# Patient Record
Sex: MALE | Race: WHITE
[De-identification: names, ages, dates, MRNs, and addresses within clinical notes are randomized per-mention and may not be internally consistent; named-entity substitution may affect disease eponyms.]

---

## 2019-10-19 ENCOUNTER — HOSPITAL ENCOUNTER (OUTPATIENT)
Dept: HOSPITAL 76 - EMS | Age: 84
Discharge: ANOTHER HOSPITAL | End: 2019-10-19
Attending: SURGERY
Payer: MEDICARE

## 2019-10-19 ENCOUNTER — HOSPITAL ENCOUNTER (EMERGENCY)
Dept: HOSPITAL 76 - ED | Age: 84
Discharge: ANOTHER HOSPITAL | End: 2019-10-19
Payer: MEDICARE

## 2019-10-19 VITALS — DIASTOLIC BLOOD PRESSURE: 83 MMHG | SYSTOLIC BLOOD PRESSURE: 138 MMHG

## 2019-10-19 DIAGNOSIS — I10: ICD-10-CM
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
## 2019-10-19 DIAGNOSIS — Z85.72: ICD-10-CM

## 2019-10-19 DIAGNOSIS — Z92.3: ICD-10-CM

## 2019-10-19 DIAGNOSIS — S22.030A: Primary | ICD-10-CM

## 2019-10-19 DIAGNOSIS — S22.039A: Primary | ICD-10-CM
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
## 2019-10-19 LAB
ANION GAP SERPL CALCULATED.4IONS-SCNC: 12 MMOL/L (ref 6–13)
BASOPHILS NFR BLD AUTO: 0.1 10^3/UL (ref 0–0.1)
BASOPHILS NFR BLD AUTO: 0.6 %
BUN SERPL-MCNC: 20 MG/DL (ref 6–20)
CALCIUM UR-MCNC: 9.2 MG/DL (ref 8.5–10.3)
CHLORIDE SERPL-SCNC: 96 MMOL/L (ref 101–111)
CO2 SERPL-SCNC: 30 MMOL/L (ref 21–32)
CREAT SERPLBLD-SCNC: 0.8 MG/DL (ref 0.6–1.2)
EOSINOPHIL # BLD AUTO: 0.1 10^3/UL (ref 0–0.7)
EOSINOPHIL NFR BLD AUTO: 0.9 %
ERYTHROCYTE [DISTWIDTH] IN BLOOD BY AUTOMATED COUNT: 13.3 % (ref 12–15)
GFRSERPLBLD MDRD-ARVRAT: 93 ML/MIN/{1.73_M2} (ref 89–?)
GLUCOSE SERPL-MCNC: 135 MG/DL (ref 70–100)
HGB UR QL STRIP: 15.3 G/DL (ref 14–18)
LYMPHOCYTES # SPEC AUTO: 0.4 10^3/UL (ref 1.5–3.5)
LYMPHOCYTES NFR BLD AUTO: 3.9 %
MCH RBC QN AUTO: 30.5 PG (ref 27–31)
MCHC RBC AUTO-ENTMCNC: 32.9 G/DL (ref 32–36)
MCV RBC AUTO: 92.8 FL (ref 80–94)
MONOCYTES # BLD AUTO: 0.7 10^3/UL (ref 0–1)
MONOCYTES NFR BLD AUTO: 6.3 %
NEUTROPHILS # BLD AUTO: 9.6 10^3/UL (ref 1.5–6.6)
NEUTROPHILS # SNV AUTO: 10.9 X10^3/UL (ref 4.8–10.8)
NEUTROPHILS NFR BLD AUTO: 87.7 %
PDW BLD AUTO: 10.2 FL (ref 7.4–11.4)
PLATELET # BLD: 153 10^3/UL (ref 130–450)
RBC MAR: 5.01 10^6/UL (ref 4.7–6.1)
SODIUM SERPLBLD-SCNC: 138 MMOL/L (ref 135–145)

## 2019-10-19 PROCEDURE — 96374 THER/PROPH/DIAG INJ IV PUSH: CPT

## 2019-10-19 PROCEDURE — 80048 BASIC METABOLIC PNL TOTAL CA: CPT

## 2019-10-19 PROCEDURE — 36415 COLL VENOUS BLD VENIPUNCTURE: CPT

## 2019-10-19 PROCEDURE — 99285 EMERGENCY DEPT VISIT HI MDM: CPT

## 2019-10-19 PROCEDURE — 85025 COMPLETE CBC W/AUTO DIFF WBC: CPT

## 2019-10-19 PROCEDURE — 71250 CT THORAX DX C-: CPT

## 2019-10-19 NOTE — CT REPORT
Reason:  fall, back pain

Procedure Date:  10/19/2019   

Accession Number:  [id number] / [id number]                    

Procedure:  CT  - CHEST WO CPT Code:  

 

FULL RESULT:

 

 

EXAM:

CT CHEST

 

EXAM DATE: 10/19/2019 05:06 PM.

 

CLINICAL HISTORY: Fall, back pain.

 

COMPARISONS: None.

 

TECHNIQUE: Routine helical CT imaging was performed through the chest. IV 

contrast: None. Reconstructions: Coronal and sagittal.

 

In accordance with CT protocol optimization, one or more of the following 

dose reduction techniques were utilized for this exam: automated exposure 

control, adjustment of mA and/or KV based on patient size, or use of 

iterative reconstructive technique.

 

FINDINGS:

Lungs/Pleura: Patchy opacities are seen in the bilateral pulmonary 

apices. Irregular shaped focus of nodular consolidation is seen in the 

posterior aspect of the left upper lobe measuring up to 1.5 cm. 

Similarly, there is a 0.8 cm focus of nodular consolidation in the 

superior segment of the left lower lobe along with a similar 1.3 cm focus 

of nodular consolidation in the medial aspect of the left lower lobe with 

a regular borders. Central bronchial wall thickening is seen with mild 

cylindrical bronchiectasis which could reflect sequelae of chronic 

reactive airways or bronchitis. No other focal consolidation. No pleural 

effusions.

 

Mediastinum: Normal heart size. No pericardial effusion. Moderate 

coronary artery calcifications noted. Mild atheromatous Plaque of the 

Aortic Arch. No Enlarged Mediastinal or Hilar Lymph Nodes.

 

Bones: Osteopenia. Age indeterminate compression deformity of T3 is seen 

with approximately 50% vertebral body height loss and evidence for mild 

retropulsion.

 

Visualized Abdomen: Cholecystectomy evident.

 

Other: None.

IMPRESSION:

1. Biapical patchy opacities along with the regular nodular foci of 

consolidation at the left upper lobe and left lower lobe are of unclear 

etiology. Findings could reflect infection in the appropriate context. 

Malignancy is also not entirely excluded given the imaging appearance. 

Correlation with patient's symptoms is recommended. Short interval 

follow-up CT in 3 months is recommended for reassessment.

2. Bilateral central bronchial wall thickening with mild bronchiectasis 

could reflect sequelae of chronic central airways disease.

3. No enlarged mediastinal or hilar lymph nodes.

4. Compression fracture of T3 is seen with approximately 50% vertebral 

height loss and mild retropulsion posteriorly into the spinal canal. This 

is worrisome for acute compression fracture. Thoracic spine MRI should be 

considered for further assessment, for evaluation of possible spinal cord 

involvement.

 

RADIA

## 2019-10-19 NOTE — ED PHYSICIAN DOCUMENTATION
History of Present Illness





- Stated complaint


Stated Complaint: GLF





- Chief complaint


Chief Complaint: General





- History obtained from


History obtained from: Patient, Family





- History of Present Illness


Timing: Today


Pain level max: 7


Pain level now: 5





- Additonal information


Additional information: 





83-year-old male [place] the [place] today when he was walking on a

bale of hay, slipped and fell backwards landing on his upper back. No head or 

neck pain.  No loss of consciousness.  No vomiting.  Not on blood thinners.  Is 

having pain near the shoulder blades bilaterally.  Worse with movement and 

better with rest.  Did not take anything prior to arrival.  No hip, knee or 

ankle pain.





Review of Systems


Ten Systems: 10 systems reviewed and negative


Constitutional: denies: Fever, Chills


Ears: denies: Ear pain


Nose: denies: Rhinorrhea / runny nose, Congestion


Respiratory: denies: Cough


GI: denies: Nausea, Vomiting, Diarrhea


: denies: Dysuria


Skin: denies: Rash


Musculoskeletal: denies: Neck pain


Neurologic: denies: Focal weakness, Numbness, Headache





PD PAST MEDICAL HISTORY





- Past Medical History


Past Medical History: Yes


Cardiovascular: Hypertension





- Present Medications


Home Medications: 


                                Ambulatory Orders











 Medication  Instructions  Recorded  Confirmed


 


Hydrochlorothiazide  10/19/19 


 


Lisinopril  10/19/19 














- Allergies


Allergies/Adverse Reactions: 


                                    Allergies











Allergy/AdvReac Type Severity Reaction Status Date / Time


 


No Known Drug Allergies Allergy   Verified 10/19/19 15:52














- Living Situation


Living Situation: reports: With family


Living Arrangement: reports: At home





- Social History


Does the pt smoke?: No


Does the pt have substance abuse?: No





- Family History


Family history: reports: Non contributory





PD ED PE NORMAL





- Vitals


Vital signs reviewed: Yes





- General


General: Alert and oriented X 3, No acute distress





- HEENT


HEENT: Atraumatic, PERRL, Moist mucous membranes





- Neck


Neck: Supple, no meningeal sign, No bony TTP





- Cardiac


Cardiac: RRR, Strong equal pulses





- Respiratory


Respiratory: No respiratory distress, Clear bilaterally





- Abdomen


Abdomen: Soft, Non tender, Non distended





- Back


Back: Other (Mild paraspinal tenderness bilateral upper thoracic area.Pain is 

worse with movement No crepitus.  No tenderness over the ribs.)





- Derm


Derm: Warm and dry





- Extremities


Extremities: No deformity, No tenderness to palpate, Other (Normal bilateral 

lower extremity patellar and ankle jerk reflexes. Normal great toe extension 

bilaterally. no saddle anesthesia)





- Neuro


Neuro: Alert and oriented X 3, CNs 2-12 intact, No motor deficit, No sensory 

deficit, Normal speech





- Psych


Psych: Normal mood, Normal affect





Results





- Vitals


Vitals: 


                               Vital Signs - 24 hr











  10/19/19 10/19/19





  15:50 18:34


 


Temperature 36.8 C 


 


Heart Rate 76 91


 


Respiratory 20 14





Rate  


 


Blood Pressure 118/64 145/81 H


 


O2 Saturation 97 99








                                     Oxygen











O2 Source                      Room air

















- Labs


Labs: 


                                Laboratory Tests











  10/19/19 10/19/19





  18:30 18:30


 


WBC  10.9 H 


 


RBC  5.01 


 


Hgb  15.3 


 


Hct  46.5 


 


MCV  92.8 


 


MCH  30.5 


 


MCHC  32.9 


 


RDW  13.3 


 


Plt Count  153 


 


MPV  10.2 


 


Neut # (Auto)  9.6 H 


 


Lymph # (Auto)  0.4 L 


 


Mono # (Auto)  0.7 


 


Eos # (Auto)  0.1 


 


Baso # (Auto)  0.1 


 


Absolute Nucleated RBC  0.00 


 


Nucleated RBC %  0.0 


 


Sodium   138


 


Potassium   4.0


 


Chloride   96 L


 


Carbon Dioxide   30


 


Anion Gap   12.0


 


BUN   20


 


Creatinine   0.8


 


Estimated GFR (MDRD)   93


 


Glucose   135 H


 


Calcium   9.2














- Rads (name of study)


  ** CT chest 


Radiology: Prelim report reviewed, EMP read contemporaneously, See rad report 

(1. Biapical patchy opacities along with the regular nodular foci of 

consolidation at the left upper lobe and left lower lobe are of unclear 

etiology. Findings could reflect infection in the appropriate context. 

Malignancy is also not entirely excluded given the imaging appearance. 

Correlation with patient's symptoms is recommended. Short interval follow-up CT 

in 3 months is recommended for reassessment. 2. Bilateral central bronchial wall

thickening with mild bronchiectasis could reflect sequelae of chronic central 

airways disease. 3. No enlarged mediastinal or hilar lymph nodes. 4. Compression

fracture of T3 is seen with approximately 50% vertebral height loss and mild 

retropulsion posteriorly into the spinal canal. This is worrisome for acute 

compression fracture. Thoracic spine MRI should be considered for further 

assessment, for evaluation of possible spinal cord involvement. )





PD MEDICAL DECISION MAKING





- ED course


Complexity details: reviewed results, re-evaluated patient, considered 

differential, d/w patient, d/w family, d/w consultant


ED course: 





Patient with a T3 compression fracture with 3 mm of retropulsed fragments.  No 

MRI available here.  Discussed with Dr.Sean Cruz in the emergency department 

at [facility] who graciously accepts in transfer at [id number].  COBRA 

forms completed.  Also discussed with trauma surgery at [facility] in [place] 

who agrees with transfer as well.  The other CT chest findings are likely due to

his history of radiation for non-Hodgkin's lymphoma.  His daughter is a local 

pediatrician and states that these findings are old.  Patient transferred





This document was made in part using voice recognition software. While efforts 

are made to proofread this document, sound alike and grammatical errors may 

occur.





Departure





- Departure


Disposition: 02 Transfer [facility]


Clinical Impression: 


Compression fracture of T3 vertebra


Qualifiers:


 Encounter type: initial encounter Qualified Code(s): S22.030A - Wedge 

compression fracture of third thoracic vertebra, initial encounter for closed 

fracture





Condition: Good